# Patient Record
Sex: FEMALE | Race: WHITE | NOT HISPANIC OR LATINO | URBAN - METROPOLITAN AREA
[De-identification: names, ages, dates, MRNs, and addresses within clinical notes are randomized per-mention and may not be internally consistent; named-entity substitution may affect disease eponyms.]

---

## 2019-02-11 ENCOUNTER — OFFICE VISIT - HEALTHEAST (OUTPATIENT)
Dept: FAMILY MEDICINE | Facility: CLINIC | Age: 49
End: 2019-02-11

## 2019-02-11 DIAGNOSIS — N63.0 LUMP OR MASS IN BREAST: ICD-10-CM

## 2019-02-11 ASSESSMENT — MIFFLIN-ST. JEOR: SCORE: 1496.98

## 2019-02-12 ENCOUNTER — HOSPITAL ENCOUNTER (OUTPATIENT)
Dept: MAMMOGRAPHY | Facility: CLINIC | Age: 49
Discharge: HOME | End: 2019-02-12
Attending: FAMILY MEDICINE | Admitting: RADIOLOGY

## 2019-02-12 ENCOUNTER — HOSPITAL ENCOUNTER (OUTPATIENT)
Dept: ULTRASOUND IMAGING | Facility: CLINIC | Age: 49
Discharge: HOME | End: 2019-02-12
Attending: FAMILY MEDICINE

## 2019-02-12 DIAGNOSIS — N60.02 BREAST CYST, LEFT: ICD-10-CM

## 2019-02-12 DIAGNOSIS — N63.0 LUMP OR MASS IN BREAST: ICD-10-CM

## 2019-02-20 ENCOUNTER — COMMUNICATION - HEALTHEAST (OUTPATIENT)
Dept: FAMILY MEDICINE | Facility: CLINIC | Age: 49
End: 2019-02-20

## 2019-02-22 ENCOUNTER — COMMUNICATION - HEALTHEAST (OUTPATIENT)
Dept: FAMILY MEDICINE | Facility: CLINIC | Age: 49
End: 2019-02-22

## 2019-02-22 LAB
CAP COMMENT: NORMAL
LAB AP CHARGES (HE HISTORICAL CONVERSION): NORMAL
LAB MED GENERAL PATH INTERP (HE HISTORICAL CONVERSION): NORMAL
PATH REPORT.COMMENTS IMP SPEC: NORMAL
PATH REPORT.COMMENTS IMP SPEC: NORMAL
PATH REPORT.FINAL DX SPEC: NORMAL
PATH REPORT.MICROSCOPIC SPEC OTHER STN: NORMAL
PATH REPORT.RELEVANT HX SPEC: NORMAL
RESULT FLAG (HE HISTORICAL CONVERSION): NORMAL
SPECIMEN DESCRIPTION: NORMAL

## 2021-05-27 ENCOUNTER — RECORDS - HEALTHEAST (OUTPATIENT)
Dept: ADMINISTRATIVE | Facility: CLINIC | Age: 51
End: 2021-05-27

## 2021-06-02 VITALS — HEIGHT: 72 IN | WEIGHT: 168 LBS | BODY MASS INDEX: 22.75 KG/M2

## 2021-06-23 NOTE — PROGRESS NOTES
Assessment: /    Plan:    1. Lump or mass in breast  Mammo Diagnostic Left    US Breast Limited (Focal) Left       Diagnostic mammogram and ultrasound, as soon as possible.  Edis application completed.      Subjective:    HPI:  Enedina Don is a 48-year-old female presenting with a lump in the left breast.  She noticed this yesterday.  She has not previously had a lump in the breast.  She had a normal mammogram in March 2016.    She also notes hot flashes that have been occurring every night during the past week, and several times during the preceding 3 weeks.  She also had hot flashes for a brief time approximately 6 months ago.  Her younger sister has been having hot flashes.  Enedina states that her mother had menopause in her mid 50s.    She occasionally has pain in the left trapezius muscle area, which she has been thinking is due to mixed martial arts, but she wonders if there is any connection to the breast lump.      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    Review of Systems: No cough or dyspnea.  She had 2 days of vomiting and diarrhea last week, and had 1 week of vomiting and diarrhea at [place].      No current outpatient medications on file.     No current facility-administered medications for this visit.          Objective:    Vitals:    02/11/19 1651   BP: 116/70   Pulse: 60   Resp: 18   Temp: 98.3  F (36.8  C)   SpO2: 100%       Gen:  NAD, VSS  Lungs:  normal  Heart:  normal  Breasts: There is a firm, mobile 1.5 x 2 cm mass at 12:00, 2 cm from the left nipple.  There is also thickening at 11:00.  No axillary adenopathy, no nipple discharge.  Right breast has scattered fibrocystic changes, no mass, no adenopathy, no nipple discharge.  No skin changes bilateral.        ADDITIONAL HISTORY SUMMARIZED (2): Reviewed March 2016 physical and mammogram.  DECISION TO OBTAIN EXTRA INFORMATION (1): None.   RADIOLOGY TESTS (1): Diagnostic mammogram and  ultrasound..  LABS (1): None.  MEDICINE TESTS (1): None.  INDEPENDENT REVIEW (2 each): None.     Total Data Points: 3

## 2021-06-24 NOTE — TELEPHONE ENCOUNTER
Patient Returning Call  Reason for call:  Patient called back.      Information relayed to patient:  Informed that the results are still in process.   Patient would like Dr Buchanan to find out what the results are when he is back in office tomorrow and call her back.    Patient has additional questions:  Yes  If YES, what are your questions/concerns:  As above.  Okay to leave a detailed message?: Yes

## 2021-06-26 ENCOUNTER — HEALTH MAINTENANCE LETTER (OUTPATIENT)
Age: 51
End: 2021-06-26

## 2021-10-16 ENCOUNTER — HEALTH MAINTENANCE LETTER (OUTPATIENT)
Age: 51
End: 2021-10-16

## 2022-07-23 ENCOUNTER — HEALTH MAINTENANCE LETTER (OUTPATIENT)
Age: 52
End: 2022-07-23

## 2022-10-01 ENCOUNTER — HEALTH MAINTENANCE LETTER (OUTPATIENT)
Age: 52
End: 2022-10-01

## 2023-08-06 ENCOUNTER — HEALTH MAINTENANCE LETTER (OUTPATIENT)
Age: 53
End: 2023-08-06